# Patient Record
Sex: MALE | Employment: OTHER | URBAN - METROPOLITAN AREA
[De-identification: names, ages, dates, MRNs, and addresses within clinical notes are randomized per-mention and may not be internally consistent; named-entity substitution may affect disease eponyms.]

---

## 2017-01-03 ENCOUNTER — NURSING HOME VISIT (OUTPATIENT)
Dept: GERIATRICS | Facility: CLINIC | Age: 82
End: 2017-01-03
Payer: MEDICARE

## 2017-01-03 VITALS
OXYGEN SATURATION: 96 % | WEIGHT: 193.1 LBS | HEART RATE: 79 BPM | SYSTOLIC BLOOD PRESSURE: 110 MMHG | RESPIRATION RATE: 17 BRPM | DIASTOLIC BLOOD PRESSURE: 75 MMHG | TEMPERATURE: 98.4 F

## 2017-01-03 DIAGNOSIS — D64.9 ANEMIA, UNSPECIFIED TYPE: ICD-10-CM

## 2017-01-03 DIAGNOSIS — I10 ESSENTIAL HYPERTENSION: ICD-10-CM

## 2017-01-03 DIAGNOSIS — M17.11 PRIMARY OSTEOARTHRITIS OF RIGHT KNEE: ICD-10-CM

## 2017-01-03 DIAGNOSIS — Z98.890 S/P ORIF (OPEN REDUCTION INTERNAL FIXATION) FRACTURE: Primary | ICD-10-CM

## 2017-01-03 DIAGNOSIS — Z87.81 S/P ORIF (OPEN REDUCTION INTERNAL FIXATION) FRACTURE: Primary | ICD-10-CM

## 2017-01-03 DIAGNOSIS — R60.0 LOCALIZED EDEMA: ICD-10-CM

## 2017-01-03 PROCEDURE — 99309 SBSQ NF CARE MODERATE MDM 30: CPT | Performed by: NURSE PRACTITIONER

## 2017-01-03 PROCEDURE — 99207 ZZC CDG-CORRECTLY CODED, REVIEWED AND AGREE: CPT | Performed by: NURSE PRACTITIONER

## 2017-01-03 NOTE — PROGRESS NOTES
[facility] [facility]    Chief Complaint   Patient presents with     Nursing Home Acute       HPI:    Jasper Shah is a 81 year old  (1935), who is being seen today for an episodic care visit at [facility] . Today's concern is:  S/P ORIF (open reduction internal fixation) fracture  Healing well - progressing in Therapy -   Noticing and c/o increased edema on R side and a bit on L   Noted on celebrex since surgery and due to be done on 1/6    Essential hypertension  Low BP - on BB, HCTZ and cardura    Anemia, unspecified type  Last HGB 6.9 up to 8.8  Remains on Dye 2  On Fe    Localized edema  Worsening in past week and pt asking to be seen in regards to it.     Primary osteoarthritis of right knee  C/o pain in R knee -  Hx of cortisone inj in past.       ALLERGIES: Review of patient's allergies indicates no known allergies.  Past Medical, Surgical, Family and Social History reviewed and updated in [facility].    Current Outpatient Prescriptions   Medication Sig Dispense Refill     ASPIRIN PO Take 81 mg by mouth daily       ATENOLOL PO Take 50 mg by mouth daily       HYDROCHLOROTHIAZIDE PO Take 12.5 mg by mouth daily       Ferrous Sulfate (IRON SUPPLEMENT PO) Take 325 mg by mouth daily       Multiple Vitamins-Minerals (MULTIVITAMIN ADULT PO) Take by mouth daily       Celecoxib (CELEBREX PO) Take 200 mg by mouth daily       Polyethylene Glycol POWD 17 g daily as needed       oxyCODONE-acetaminophen (PERCOCET) 5-325 MG per tablet Take 1-2 tablets by mouth every 4 hours as needed for moderate to severe pain       senna-docusate (SENOKOT-S;PERICOLACE) 8.6-50 MG per tablet Take 2 tablets by mouth 2 times daily       GABAPENTIN PO Take 300 mg by mouth 2 times daily       Morphine Sulfate (MS CONTIN PO) Take 30 mg by mouth every 12 hours       DOXAZOSIN MESYLATE PO Take 8 mg by mouth daily       PAROXETINE HCL PO Take 20 mg by mouth daily         REVIEW OF SYSTEMS:  4 point ROS including Respiratory, CV, GI  and , other than that noted in the HPI,  is negative    Physical Exam:  /75 mmHg  Pulse 79  Temp(Src) 98.4  F (36.9  C)  Resp 17  Wt 193 lb 1.6 oz (87.59 kg)  SpO2 96%  GENERAL APPEARANCE:  Alert, in no distress  RESP:  respiratory effort and palpation of chest normal, no respiratory distress  CV:  , rate and rhythm reg,  +2 on R and trace on L peripheral edema  ABDOMEN:  normal bowel sounds, soft, nontender, no hepatosplenomegaly or other masses  M/S:   Gait and station walker, pain with palpation of R knee at tarun patella  SKIN:  Inspection and Palpation of skin and subcutaneous tissue normal - R hip incision - healed  PSYCH:  insight and judgement, memory good , affect and mood normal      Recent Labs:    12/6/16:  Sodium  139  Potassium  3.9    12/13/16:  BUN  29H  Creatinine  1.32H    12/13/16:  HGB  6.9LL  PLT  244    12/22 labs HGB 8.8 WBC 3.8   K 4.3 Cr 1.04 BUN 21    Assessment/Plan:  S/P ORIF (open reduction internal fixation) fracture  DC celebrex adn monitor pain    Essential hypertension  Low - if contineus to be low may back down on cardura  BP Readings from Last 3 Encounters:   01/03/17 110/75   12/27/16 136/67   12/20/16 131/76     Anemia, unspecified type  Recheck - might also be cause of edema - low albumin and risk for GI givne Marquez 2    Localized edema  Treat by stopping marquez 2 - compression - monitoring - check hgb.     Primary osteoarthritis of right knee  F/u ortho for injection  Or us.       Orders:  1. HGB this week on Friday  2. DC celebrex - likely cause of anemia  3. Start ACE wraps to R leg to treat edema in spiral fashion  4. Please call ortho to see if he would do cortisone inj in R knee or if he approves of us doing it on site.         Electronically signed by  CHANDANA Espino CNP

## 2017-01-05 ENCOUNTER — HOSPITAL LABORATORY (OUTPATIENT)
Facility: OTHER | Age: 82
End: 2017-01-05

## 2017-01-05 LAB — HGB BLD-MCNC: 8.7 G/DL (ref 13.3–17.7)

## 2017-01-17 ENCOUNTER — DISCHARGE SUMMARY NURSING HOME (OUTPATIENT)
Dept: GERIATRICS | Facility: CLINIC | Age: 82
End: 2017-01-17
Payer: MEDICARE

## 2017-01-17 VITALS
RESPIRATION RATE: 18 BRPM | OXYGEN SATURATION: 96 % | DIASTOLIC BLOOD PRESSURE: 61 MMHG | SYSTOLIC BLOOD PRESSURE: 139 MMHG | WEIGHT: 191.8 LBS | TEMPERATURE: 96.9 F | HEART RATE: 70 BPM

## 2017-01-17 DIAGNOSIS — Z96.641 S/P HIP REPLACEMENT, RIGHT: ICD-10-CM

## 2017-01-17 DIAGNOSIS — M16.11 PRIMARY OSTEOARTHRITIS OF RIGHT HIP: Primary | ICD-10-CM

## 2017-01-17 DIAGNOSIS — I10 ESSENTIAL HYPERTENSION: ICD-10-CM

## 2017-01-17 DIAGNOSIS — D50.8 OTHER IRON DEFICIENCY ANEMIA: ICD-10-CM

## 2017-01-17 PROCEDURE — 99316 NF DSCHRG MGMT 30 MIN+: CPT | Performed by: NURSE PRACTITIONER

## 2017-01-17 NOTE — PROGRESS NOTES
[facility] DISCHARGE SUMMARY    PATIENT'S NAME: Jasper Shah  YOB: 1935    PRIMARY CARE PROVIDER AND CLINIC RESPONSIBLE AFTER TRANSFER: Heath Arce, [facility]    CODE STATUS/ADVANCE DIRECTIVES DISCUSSION: DNR / DNI    ALLERGIES: Review of patient's allergies indicates no known allergies.    TRANSFERRING PROVIDERS:  Andreia Santoyo CNP, Elyo Kang MD   DATE OF SNF ADMISSION:  December / 16 / 2016  DATE OF SNF (anticipated) DISCHARGE: January / 20 / 2017  DISCHARGE DISPOSITION: Non-FMG Provider  Nursing Facility: [facility] stay 12/12/16  to 12/16/16.     Condition on Discharge:  Improving.    Function:  Ambulates independently with 4WW, independent with all care needs  Cognitive Scores: SLUMS 25/30    Equipment: walker      [facility] Nursing Facility Course:  Jasper admitted to TCU after elective R JHOAN to receive therapy. He is the primary caregiver for his wife, so needed to have excellent mobility in order to continue to care for her after discharge. He has achieved this,a nd is independent with all cares/mobility.       Primary osteoarthritis of right hip  S/P hip replacement, right  Ambulating, independent with all cares.     Essential hypertension  No headaches, no dizziness. Stable on current meds    Other iron deficiency anemia  HEMOGLOBIN   Date Value Ref Range Status   01/05/2017 8.7* 13.3 - 17.7 g/dL Final   ]   Hgb pending for this week before discharge, anticipate it will be much improved.           PAST MEDICAL HISTORY:  has no past medical history on file.    DISCHARGE MEDICATIONS:  Current Outpatient Prescriptions   Medication Sig Dispense Refill     ASPIRIN PO Take 81 mg by mouth daily       ATENOLOL PO Take 50 mg by mouth daily       HYDROCHLOROTHIAZIDE PO Take 12.5 mg by mouth daily       Ferrous Sulfate (IRON SUPPLEMENT PO) Take 325 mg by mouth daily       Multiple Vitamins-Minerals (MULTIVITAMIN ADULT PO) Take  by mouth daily       Polyethylene Glycol POWD 17 g daily as needed       oxyCODONE-acetaminophen (PERCOCET) 5-325 MG per tablet Take 1-2 tablets by mouth every 4 hours as needed for moderate to severe pain       senna-docusate (SENOKOT-S;PERICOLACE) 8.6-50 MG per tablet Take 2 tablets by mouth 2 times daily       GABAPENTIN PO Take 300 mg by mouth 2 times daily       Morphine Sulfate (MS CONTIN PO) Take 30 mg by mouth every 12 hours       DOXAZOSIN MESYLATE PO Take 8 mg by mouth daily       PAROXETINE HCL PO Take 20 mg by mouth daily       Celecoxib (CELEBREX PO) Take 200 mg by mouth daily         Review of Systems:  No CP, SOB, Cough, dizziness, fevers, chills, HA, N/V, dysuria or Bowel Abnormalities. Appetite is good.  Pain controlled with baseline narcotic use as per prior to surgery.     /61 mmHg  Pulse 70  Temp(Src) 96.9  F (36.1  C)  Resp 18  Wt 191 lb 12.8 oz (87 kg)  SpO2 96%    Physical Exam:  A & O x 3, NAD. Lungs CTA, non labored. RRR, S1/S2 w/o murmur,gallop or rub.  No edema.    DISCHARGE PLAN:  Homecare through the [facility] to [facility].Follow-up with PCP in 7 days.    Current [place] scheduled appointments:  None    Pending labs:   Hgb pending for 1/18/17    [facility] labs   Hospital Laboratory on 01/05/2017   Component Date Value Ref Range Status     Hemoglobin 01/05/2017 8.7* 13.3 - 17.7 g/dL Final         Discharge Treatments: none    Orders:  1.  Debrox 4gtts to both ears Bid x4 days.  2.  On 1/19/17 (before DC on 1/20/17) please flush both ears with warms water until clear when viewed with otoscope.      TOTAL DISCHARGE TIME:   Greater than 30 minutes  Electronically signed by:  Andreia Santoyo CNP   [facility]  [phone] cell

## 2017-01-18 PROBLEM — M17.11 PRIMARY OSTEOARTHRITIS OF RIGHT KNEE: Status: ACTIVE | Noted: 2017-01-18

## 2017-01-18 PROBLEM — M16.11 PRIMARY OSTEOARTHRITIS OF RIGHT HIP: Status: ACTIVE | Noted: 2017-01-18

## 2017-01-18 PROBLEM — I10 ESSENTIAL HYPERTENSION: Status: ACTIVE | Noted: 2017-01-18

## 2017-01-18 PROBLEM — Z96.641 S/P HIP REPLACEMENT, RIGHT: Status: ACTIVE | Noted: 2017-01-18

## 2017-01-18 PROBLEM — Z87.81 S/P ORIF (OPEN REDUCTION INTERNAL FIXATION) FRACTURE: Status: ACTIVE | Noted: 2017-01-18

## 2017-01-18 PROBLEM — D50.8 OTHER IRON DEFICIENCY ANEMIA: Status: ACTIVE | Noted: 2017-01-18

## 2017-01-18 PROBLEM — Z98.890 S/P ORIF (OPEN REDUCTION INTERNAL FIXATION) FRACTURE: Status: ACTIVE | Noted: 2017-01-18

## 2017-01-19 ENCOUNTER — HOSPITAL LABORATORY (OUTPATIENT)
Facility: OTHER | Age: 82
End: 2017-01-19

## 2017-01-19 LAB — HGB BLD-MCNC: 10.4 G/DL (ref 13.3–17.7)

## 2018-10-01 VITALS
RESPIRATION RATE: 16 BRPM | OXYGEN SATURATION: 92 % | DIASTOLIC BLOOD PRESSURE: 52 MMHG | HEART RATE: 56 BPM | TEMPERATURE: 98.1 F | SYSTOLIC BLOOD PRESSURE: 105 MMHG

## 2018-10-01 PROBLEM — M23.206: Status: ACTIVE | Noted: 2018-10-01

## 2018-10-01 PROBLEM — D50.9 IRON DEFICIENCY ANEMIA: Status: ACTIVE | Noted: 2017-01-18

## 2018-10-01 PROBLEM — M25.469 SWELLING OF KNEE: Status: ACTIVE | Noted: 2017-01-11

## 2018-10-01 PROBLEM — Z96.649 HISTORY OF TOTAL HIP REPLACEMENT: Status: ACTIVE | Noted: 2017-01-18

## 2018-10-01 PROBLEM — D50.0 IRON DEFICIENCY ANEMIA DUE TO CHRONIC BLOOD LOSS: Status: ACTIVE | Noted: 2017-01-18

## 2018-10-01 PROBLEM — M16.9 OSTEOARTHRITIS OF HIP: Status: ACTIVE | Noted: 2017-01-18

## 2018-10-01 RX ORDER — ACETAMINOPHEN 325 MG/1
TABLET ORAL
COMMUNITY

## 2018-10-01 NOTE — PROGRESS NOTES
[place] [facility]  PRIMARY CARE PROVIDER AND CLINIC:  Heath Arce [facility] [address] / [facility] [address]*  Chief Complaint   Patient presents with     Clinic Care Coordination - Initial     [place] Medical Record Number:  [id number]    HPI:    Jasper Shah is a 83 year old  (1935),admitted to the [facility] SNF from [facility] [place].  Hospital stay 9/25/18  through 9/28/18.  Admitted to this facility for  rehab, medical management and nursing care.  HPI information obtained from: facility chart records, facility staff and patient report.  Current issues are:            Patient had left JHOAN done on 9/25/18 by Dr. Fung at [facility]. Uncomplicated surgery/ post operative.  had right done about a year ago. To [place] today for rehab.Has Chronic back pain: Takes ms contin 30 mg bid at home.       Pain well controlled has not used as needed oxycodone in the past 2 days   Reports his MS Contin is working well to control his pain   Doing well   Denies any concerns     CODE STATUS/ADVANCE DIRECTIVES DISCUSSION:   CPR/Full code   Patient's living condition: lives alone    ALLERGIES:Review of patient's allergies indicates no known allergies.    Surgical History    Surgery Date Laterality Comments   TURP     TURP   Medical History    Medical History Date Comments   Unspecified essential hypertension       Backache, unspecified 60,s     Issue of repeat prescriptions 07     Social History    Tobacco Use Types Packs/Day Years Used Date   Former Smoker Cigarettes 1 25 Quit: 01/01/1973   Smokeless Tobacco: Never Used           Tobacco Cessation: Counseling Given: Yes  Comments: Passive exposure      Alcohol Use Drinks/Week oz/Week Comments   No 0 Standard drinks or equivalent   0.0  None     Sex Assigned at Birth Date Recorded   Not on file         Post Discharge Medication Reconciliation Status: discharge medications reconciled, continue medications without  change.  Current Outpatient Prescriptions   Medication Sig Dispense Refill     acetaminophen (TYLENOL) 325 MG tablet Take 1 tablet by mouth for pain 1-5/10 and 2 tablets by mouth for pain 6-10/10 every 4 hours as needed. Max dose 4000mg in 24 hrs  mild pain       ASPIRIN PO Take 81 mg by mouth daily       ATENOLOL PO Take 50 mg by mouth daily       DOXAZOSIN MESYLATE PO Take 8 mg by mouth daily       Ferrous Sulfate (IRON SUPPLEMENT PO) Take 65 mg by mouth 2 times daily (with meals)        GABAPENTIN PO Take 300 mg by mouth 3 times daily        HYDROCHLOROTHIAZIDE PO Take 12.5 mg by mouth daily       Morphine Sulfate (MS CONTIN PO) Take 30 mg by mouth every 12 hours       Multiple Vitamins-Minerals (MULTIVITAMIN ADULT PO) Take by mouth daily       oxyCODONE-acetaminophen (PERCOCET) 5-325 MG per tablet Take 1-2 tablets by mouth every 4 hours as needed for moderate to severe pain       PAROXETINE HCL PO Take 20 mg by mouth daily       Polyethylene Glycol POWD 17 g daily as needed       senna-docusate (SENOKOT-S;PERICOLACE) 8.6-50 MG per tablet Take 2 tablets by mouth 2 times daily       Celecoxib (CELEBREX PO) Take 200 mg by mouth daily         ROS:  10 point ROS of systems including Constitutional, Eyes, Respiratory, Cardiovascular, Gastroenterology, Genitourinary, Integumentary, Muscularskeletal, Psychiatric were all negative except for pertinent positives noted in my HPI.    Exam:  /52  Pulse 56  Temp 98.1  F (36.7  C)  Resp 16  SpO2 92%  GENERAL APPEARANCE:  Alert, in no distress  EYES:  EOM, conjunctivae, lids, pupils and irises normal  RESP:  respiratory effort and palpation of chest normal, lungs clear to auscultation , no respiratory distress  CV:  Palpation and auscultation of heart done , regular rate and rhythm, no murmur, rub, or gallop  ABDOMEN:  normal bowel sounds, soft, nontender, no hepatosplenomegaly or other masses  M/S:   Gait and station abnormal ambulates short distance with  walker  Digits and nails normal  SKIN:  Inspection of skin and subcutaneous tissue baseline, Palpation of skin and subcutaneous tissue baseline, left hip incision glued, no signs of infection healing well   NEURO:   Cranial nerves 2-12 are normal tested and grossly at patient's baseline  PSYCH:  oriented X 3, normal insight, judgement and memory    Lab/Diagnostic data:      Platelet count (09/28/2018 6:29 AM)  Only the most recent of 3 results within the time period is included.    Platelet count (09/28/2018 6:29 AM)   Component Value Ref Range Performed At   PLATELET COUNT 113 (L) 150 - 450 thou/cu mm [facility]   MPV 11.5 8.5 - 12.0 fL [facility]     Platelet count (09/28/2018 6:29 AM)   Specimen   Blood - Blood     Platelet count (09/28/2018 6:29 AM)   Performing Organization Address City/State/[facility] Phone Number   [facility]   [address]   [place] [address]      Back to top of Results      Hemoglobin (09/28/2018 6:29 AM)  Only the most recent of 4 results within the time period is included.    Hemoglobin (09/28/2018 6:29 AM)   Component Value Ref Range Performed At   HEMOGLOBIN 8.5 (L) 14.0 - 18.0 g/dL [facility]   MCV 86 80 - 97 fL [facility]   MCHC 32.8 32.0 - 36.0 g/dL [facility]     Hemoglobin (09/28/2018 6:29 AM)   Specimen   Blood - Blood     Hemoglobin (09/28/2018 6:29 AM)   Performing Organization Address City/[facility]/Zipcode Phone Number   [facility]   [address]   [place] [address]      Back to top of Results      BUN (09/28/2018 6:29 AM)  Only the most recent of 3 results within the time period is included.    BUN (09/28/2018 6:29 AM)   Component Value Ref Range Performed At   BUN 40 (H) 7 - 18 mg/dL [facility]     BUN (09/28/2018 6:29 AM)   Specimen   Blood - Blood     BUN (09/28/2018 6:29 AM)   Performing Organization  Address City/State/Zipcode Phone Number   [facility]   [address]   [place] [address]      Back to top of Results      Creatinine (09/28/2018 6:29 AM)  Only the most recent of 3 results within the time period is included.    Creatinine (09/28/2018 6:29 AM)   Component Value Ref Range Performed At   CREATININE 2.00 (H) 0.60 - 1.30 mg/dL [facility]   GFR if  39 (L) >60 ml/min/1.73m2 [facility]   GFR if not  32 (L) >60 ml/min/1.73m2 [facility]     Creatinine (09/28/2018 6:29 AM)   Specimen   Blood - Blood     Creatinine (09/28/2018 6:29 AM)   Performing Organization Address City/State/Zipcode Phone Number   [facility]   [address]   [place] [address]            ASSESSMENT/PLAN:  H/O total hip arthroplasty, left  Pain well controlled  physical therapy/OT involved in care   On full dose ASA for anti coagulation   Has follow up planned with ortho   On bowel regimen   On iron supplement     Essential hypertension  Stable on atenolol and HCTZ    Chronic bilateral low back pain with bilateral sciatica  On chronic MS contin and gabapentin     Depression   Stable on paxil        Orders:  1.  CBC and BMP on Thursday  2.  Keep incision open to air    Total time spent with patient visit at the skilled nursing facility was 25 min including patient visit and review of past records. Greater than 50% of total time spent with counseling and coordinating care due to post hospital stay    Electronically signed by:  Khadijah Hawkins NP

## 2018-10-02 ENCOUNTER — NURSING HOME VISIT (OUTPATIENT)
Dept: FAMILY MEDICINE | Facility: CLINIC | Age: 83
End: 2018-10-02
Payer: MEDICARE

## 2018-10-02 DIAGNOSIS — F33.0 MILD EPISODE OF RECURRENT MAJOR DEPRESSIVE DISORDER (H): ICD-10-CM

## 2018-10-02 DIAGNOSIS — I10 ESSENTIAL HYPERTENSION: ICD-10-CM

## 2018-10-02 DIAGNOSIS — M54.41 CHRONIC BILATERAL LOW BACK PAIN WITH BILATERAL SCIATICA: ICD-10-CM

## 2018-10-02 DIAGNOSIS — Z96.642 H/O TOTAL HIP ARTHROPLASTY, LEFT: Primary | ICD-10-CM

## 2018-10-02 DIAGNOSIS — M54.42 CHRONIC BILATERAL LOW BACK PAIN WITH BILATERAL SCIATICA: ICD-10-CM

## 2018-10-02 DIAGNOSIS — G89.29 CHRONIC BILATERAL LOW BACK PAIN WITH BILATERAL SCIATICA: ICD-10-CM

## 2018-10-02 PROCEDURE — 99309 SBSQ NF CARE MODERATE MDM 30: CPT | Performed by: NURSE PRACTITIONER

## 2018-10-02 NOTE — LETTER
10/2/2018        RE: Jasper Shah  [address]  [facility] [address]        [place] [facility]  PRIMARY CARE PROVIDER AND CLINIC:  Heath Arce [facility] [address] [facility]*  Chief Complaint   Patient presents with     Clinic Care Coordination - Initial     [place] Medical Record Number:  [id number]    HPI:    Jasper Shah is a 83 year old  (1935),[facility]  Hospital stay 9/25/18  through 9/28/18.  Admitted to this facility for  rehab, medical management and nursing care.  HPI information obtained from: facility chart records, facility staff and patient report.  Current issues are:            Patient had left JHOAN done on 9/25/18 by Dr. Fung at [facility]. Uncomplicated surgery/ post operative.  had right done about a year ago. To [place] today for rehab.Has Chronic back pain: Takes ms contin 30 mg bid at home.       Pain well controlled has not used as needed oxycodone in the past 2 days   Reports his MS Contin is working well to control his pain   Doing well   Denies any concerns     CODE STATUS/ADVANCE DIRECTIVES DISCUSSION:   CPR/Full code   Patient's living condition: lives alone    ALLERGIES:Review of patient's allergies indicates no known allergies.    Surgical History    Surgery Date Laterality Comments   TURP     TURP   Medical History    Medical History Date Comments   Unspecified essential hypertension       Backache, unspecified 60,s     Issue of repeat prescriptions 07     Social History    Tobacco Use Types Packs/Day Years Used Date   Former Smoker Cigarettes 1 25 Quit: 01/01/1973   Smokeless Tobacco: Never Used           Tobacco Cessation: Counseling Given: Yes  Comments: Passive exposure      Alcohol Use Drinks/Week oz/Week Comments   No 0 Standard drinks or equivalent   0.0  None     Sex Assigned at Birth Date Recorded   Not on file         Post Discharge Medication  Reconciliation Status: discharge medications reconciled, continue medications without change.  Current Outpatient Prescriptions   Medication Sig Dispense Refill     acetaminophen (TYLENOL) 325 MG tablet Take 1 tablet by mouth for pain 1-5/10 and 2 tablets by mouth for pain 6-10/10 every 4 hours as needed. Max dose 4000mg in 24 hrs  mild pain       ASPIRIN PO Take 81 mg by mouth daily       ATENOLOL PO Take 50 mg by mouth daily       DOXAZOSIN MESYLATE PO Take 8 mg by mouth daily       Ferrous Sulfate (IRON SUPPLEMENT PO) Take 65 mg by mouth 2 times daily (with meals)        GABAPENTIN PO Take 300 mg by mouth 3 times daily        HYDROCHLOROTHIAZIDE PO Take 12.5 mg by mouth daily       Morphine Sulfate (MS CONTIN PO) Take 30 mg by mouth every 12 hours       Multiple Vitamins-Minerals (MULTIVITAMIN ADULT PO) Take by mouth daily       oxyCODONE-acetaminophen (PERCOCET) 5-325 MG per tablet Take 1-2 tablets by mouth every 4 hours as needed for moderate to severe pain       PAROXETINE HCL PO Take 20 mg by mouth daily       Polyethylene Glycol POWD 17 g daily as needed       senna-docusate (SENOKOT-S;PERICOLACE) 8.6-50 MG per tablet Take 2 tablets by mouth 2 times daily       Celecoxib (CELEBREX PO) Take 200 mg by mouth daily         ROS:  10 point ROS of systems including Constitutional, Eyes, Respiratory, Cardiovascular, Gastroenterology, Genitourinary, Integumentary, Muscularskeletal, Psychiatric were all negative except for pertinent positives noted in my HPI.    Exam:  /52  Pulse 56  Temp 98.1  F (36.7  C)  Resp 16  SpO2 92%  GENERAL APPEARANCE:  Alert, in no distress  EYES:  EOM, conjunctivae, lids, pupils and irises normal  RESP:  respiratory effort and palpation of chest normal, lungs clear to auscultation , no respiratory distress  CV:  Palpation and auscultation of heart done , regular rate and rhythm, no murmur, rub, or gallop  ABDOMEN:  normal bowel sounds, soft, nontender, no hepatosplenomegaly or  other masses  M/S:   Gait and station abnormal ambulates short distance with walker  Digits and nails normal  SKIN:  Inspection of skin and subcutaneous tissue baseline, Palpation of skin and subcutaneous tissue baseline, left hip incision glued, no signs of infection healing well   NEURO:   Cranial nerves 2-12 are normal tested and grossly at patient's baseline  PSYCH:  oriented X 3, normal insight, judgement and memory    Lab/Diagnostic data:      Platelet count (09/28/2018 6:29 AM)  Only the most recent of 3 results within the time period is included.    Platelet count (09/28/2018 6:29 AM)   Component Value Ref Range Performed At   PLATELET COUNT 113 (L) 150 - 450 thou/cu mm [facility]   MPV 11.5 8.5 - 12.0 fL [facility]     Platelet count (09/28/2018 6:29 AM)   Specimen   Blood - Blood     Platelet count (09/28/2018 6:29 AM)   Performing Organization Address City/[facility]/Zipcode Phone Number   [facility]   [address]   [place] [address]      Back to top of Results      Hemoglobin (09/28/2018 6:29 AM)  Only the most recent of 4 results within the time period is included.    Hemoglobin (09/28/2018 6:29 AM)   Component Value Ref Range Performed At   HEMOGLOBIN 8.5 (L) 14.0 - 18.0 g/dL [facility]   MCV 86 80 - 97 fL [facility]   MCHC 32.8 32.0 - 36.0 g/dL [facility]     Hemoglobin (09/28/2018 6:29 AM)   Specimen   Blood - Blood     Hemoglobin (09/28/2018 6:29 AM)   Performing Organization Address City/[facility]/Zipcode Phone Number   [facility]   [address]   [place] [address]      Back to top of Results      BUN (09/28/2018 6:29 AM)  Only the most recent of 3 results within the time period is included.    BUN (09/28/2018 6:29 AM)   Component Value Ref Range Performed At   BUN 40 (H) 7 - 18 mg/dL [facility]     BUN (09/28/2018 6:29 AM)    Specimen   Blood - Blood     BUN (09/28/2018 6:29 AM)   Performing Organization Address City/State/Zipcode Phone Number   [facility]   [address]   [place] [address]      Back to top of Results      Creatinine (09/28/2018 6:29 AM)  Only the most recent of 3 results within the time period is included.    Creatinine (09/28/2018 6:29 AM)   Component Value Ref Range Performed At   CREATININE 2.00 (H) 0.60 - 1.30 mg/dL [facility]   GFR if  39 (L) >60 ml/min/1.73m2 [facility]   GFR if not  32 (L) >60 ml/min/1.73m2 [facility]     Creatinine (09/28/2018 6:29 AM)   Specimen   Blood - Blood     Creatinine (09/28/2018 6:29 AM)   Performing Organization Address City/State/Zipcode Phone Number   [facility]   [address]   [place] [address]            ASSESSMENT/PLAN:  H/O total hip arthroplasty, left  Pain well controlled  physical therapy/OT involved in care   On full dose ASA for anti coagulation   Has follow up planned with ortho   On bowel regimen   On iron supplement     Essential hypertension  Stable on atenolol and HCTZ    Chronic bilateral low back pain with bilateral sciatica  On chronic MS contin and gabapentin     Depression   Stable on paxil        Orders:  1.  CBC and BMP on Thursday  2.  Keep incision open to air    Total time spent with patient visit at the skilled nursing facility was 25 min including patient visit and review of past records. Greater than 50% of total time spent with counseling and coordinating care due to post hospital stay    Electronically signed by:  Khadijah Hawkins NP                    Sincerely,        Khadijah Hawkins NP

## 2018-10-04 ENCOUNTER — HOSPITAL LABORATORY (OUTPATIENT)
Facility: OTHER | Age: 83
End: 2018-10-04

## 2018-10-04 LAB
ANION GAP SERPL CALCULATED.3IONS-SCNC: 10 MMOL/L (ref 3–14)
BUN SERPL-MCNC: 24 MG/DL (ref 7–30)
CALCIUM SERPL-MCNC: 8.8 MG/DL (ref 8.5–10.1)
CHLORIDE SERPL-SCNC: 100 MMOL/L (ref 94–109)
CO2 SERPL-SCNC: 27 MMOL/L (ref 20–32)
CREAT SERPL-MCNC: 1.32 MG/DL (ref 0.66–1.25)
ERYTHROCYTE [DISTWIDTH] IN BLOOD BY AUTOMATED COUNT: 13.5 % (ref 10–15)
GFR SERPL CREATININE-BSD FRML MDRD: 52 ML/MIN/1.7M2
GLUCOSE SERPL-MCNC: 201 MG/DL (ref 70–99)
HCT VFR BLD AUTO: 30.8 % (ref 40–53)
HGB BLD-MCNC: 9.7 G/DL (ref 13.3–17.7)
MCH RBC QN AUTO: 27.8 PG (ref 26.5–33)
MCHC RBC AUTO-ENTMCNC: 31.5 G/DL (ref 31.5–36.5)
MCV RBC AUTO: 88 FL (ref 78–100)
PLATELET # BLD AUTO: 256 10E9/L (ref 150–450)
POTASSIUM SERPL-SCNC: 3.6 MMOL/L (ref 3.4–5.3)
RBC # BLD AUTO: 3.49 10E12/L (ref 4.4–5.9)
SODIUM SERPL-SCNC: 137 MMOL/L (ref 133–144)
WBC # BLD AUTO: 2.5 10E9/L (ref 4–11)

## 2018-10-10 VITALS
WEIGHT: 216.2 LBS | DIASTOLIC BLOOD PRESSURE: 53 MMHG | SYSTOLIC BLOOD PRESSURE: 125 MMHG | RESPIRATION RATE: 18 BRPM | HEART RATE: 60 BPM | OXYGEN SATURATION: 96 % | TEMPERATURE: 97.6 F | BODY MASS INDEX: 29.28 KG/M2 | HEIGHT: 72 IN

## 2018-10-10 NOTE — PROGRESS NOTES
[place] [facility] DISCHARGE SUMMARY    PATIENT'S NAME: Jasper Shah  YOB: 1935  MEDICAL RECORD NUMBER:  [id number]  Place of Service where encounter took place:  [facility] (S) [[id number]]    PRIMARY CARE PROVIDER AND CLINIC RESPONSIBLE AFTER TRANSFER: Heath Arce [facility] [address] / [place] [address]     TRANSFERRING PROVIDERS: Khadijah Hawkins NP, MD Jorge Luis  DATE OF SNF ADMISSION:  September / 28 / 2018  DATE OF SNF (anticipated) DISCHARGE: October / 12 / 2018  DISCHARGE DISPOSITION: Non-FMG Provider   RECENT HOSPITALIZATION/ED:  [facility] stay 9/25/18  to 9/28/18.     CODE STATUS/ADVANCE DIRECTIVES DISCUSSION:   CPR/Full code    No Known Allergies  Condition on Discharge:  Improving.  Function:  Independent with walker   Cognitive Scores: BIMS 15/15    Equipment: walker    DISCHARGE DIAGNOSIS:   1. S/P ORIF (open reduction internal fixation) fracture    2. Essential hypertension    3. Iron deficiency anemia due to chronic blood loss    4. Primary osteoarthritis of right knee    5. Primary osteoarthritis of right hip    6. Chronic bilateral low back pain with bilateral sciatica    7. Mild episode of recurrent major depressive disorder (H)        HPI Nursing Facility Course:  HPI information obtained from: facility chart records, facility staff and patient report.  S/P Left JHOAN- Dr. Antonieta Dee on 9/25/18  Patient rehabed at [facility] following left JHOAN  He did well with therapies   Pain controlled with home dose of MS contin alone  On ASA for anticoagulation    Only complication is stage 2 pressure ulcer on left heel       Pressure injury left heel- stage 2  Heel was noted to by dariel on admission blistered and opened this week leaving stage 2 pressure ulcer   Using optifoam dressing   No sign of infection noted today    Essential hypertension  Has remained stable on home regimen of atenolol and hydrochlorothiazide      Iron  deficiency anemia due to chronic blood loss  hgb was 8.5 on 9/28  Hemoglobin   Date Value Ref Range Status   10/04/2018 9.7 (L) 13.3 - 17.7 g/dL Final   01/19/2017 10.4 (L) 13.3 - 17.7 g/dL Final     On ferrous sulfate     Primary osteoarthritis of right knee  Primary osteoarthritis of right hip  Chronic bilateral low back pain with bilateral sciatica  On MS Contin, gabapentin     Mild episode of recurrent major depressive disorder (H)  On paxil   Stable       PAST MEDICAL HISTORY:  has no past medical history on file.    DISCHARGE MEDICATIONS:  Current Outpatient Prescriptions   Medication Sig Dispense Refill     acetaminophen (TYLENOL) 325 MG tablet Take 1 tablet by mouth for pain 1-5/10 and 2 tablets by mouth for pain 6-10/10 every 4 hours as needed. Max dose 4000mg in 24 hrs  mild pain       ASPIRIN PO Take 325 mg by mouth daily       ATENOLOL PO Take 50 mg by mouth daily       DOXAZOSIN MESYLATE PO Take 8 mg by mouth daily       Ferrous Sulfate (IRON SUPPLEMENT PO) Take 65 mg by mouth 2 times daily (with meals)        GABAPENTIN PO Take 300 mg by mouth 3 times daily        HYDROCHLOROTHIAZIDE PO Take 12.5 mg by mouth daily       Morphine Sulfate (MS CONTIN PO) Take 30 mg by mouth every 12 hours       Multiple Vitamins-Minerals (MULTIVITAMIN ADULT PO) Take by mouth daily       PAROXETINE HCL PO Take 20 mg by mouth daily       senna-docusate (SENOKOT-S;PERICOLACE) 8.6-50 MG per tablet Take 2 tablets by mouth 2 times daily         MEDICATION CHANGES/RATIONALE:   discontinued oxycodone and miralax as no longer using     Controlled medications sent with patient:   Medication: mscontin  , 1 tabs given to patient at the time of discharge to take home (has 4 today)   Reports that he has this at home and does not need refilled      ROS:    10 point ROS of systems including Constitutional, Eyes, Respiratory, Cardiovascular, Gastroenterology, Genitourinary, Integumentary, Musculoskeletal, Psychiatric were all negative  except for pertinent positives noted in my HPI.    Physical Exam:   Vitals: /53  Pulse 60  Temp 97.6  F (36.4  C)  Resp 18  Ht 6' (1.829 m)  Wt 216 lb 3.2 oz (98.1 kg)  SpO2 96%  BMI 29.32 kg/m2  BMI= Body mass index is 29.32 kg/(m^2).    GENERAL APPEARANCE:  Alert, in no distress  EYES:  EOM, conjunctivae, lids, pupils and irises normal  RESP:  respiratory effort and palpation of chest normal, lungs clear to auscultation , no respiratory distress  CV:  Palpation and auscultation of heart done , regular rate and rhythm, no murmur, rub, or gallop  ABDOMEN:  normal bowel sounds, soft, nontender, no hepatosplenomegaly or other masses  M/S:   Gait and station abnormal ambulatory with walker  Digits and nails normal  SKIN:  Open area on heel measuring about 3.5 cm x 3 cm wound base a dry and pink flesh appearance- no drainage noted, surrounding skin is intact no erythema   PSYCH:  oriented X 3, affect and mood normal    DISCHARGE PLAN:  home with outpatient PT   Patient instructed to follow-up with:  Follow up appointments as below   Current [place] scheduled appointments:  Future Appointments  10/19/2018 Appointment Family Practice Heath Arce MD    [address] [place], [place] [id number] [phone] (Fax)       10/22/2018 Appointment Orthopedic Jose Fung MD    [address] [place] [address]    [phone] (Fax)            MTM referral needed and placed by this provider: No    Pending labs: none  SNF labs none  Discharge Treatments: wound cares to heel: cleanse with wound spray apply optifoam dressing and changing every 2-3 days and as needed if soiled     Orders:  1.  Ok to discharge home 10/12/18 with outpatient therapy.  Follow up with PCP in 2wks.  Current medications and treatments with following changes:  discontinue oxycodone and miralax, continue current dressing to heel change every 2-3 days and PRN if soiled.    TOTAL DISCHARGE TIME:   Greater than 30  minutes  Electronically signed by:  Khadijah Hawkins NP

## 2018-10-11 ENCOUNTER — DISCHARGE SUMMARY NURSING HOME (OUTPATIENT)
Dept: FAMILY MEDICINE | Facility: CLINIC | Age: 83
End: 2018-10-11
Payer: MEDICARE

## 2018-10-11 DIAGNOSIS — F33.0 MILD EPISODE OF RECURRENT MAJOR DEPRESSIVE DISORDER (H): ICD-10-CM

## 2018-10-11 DIAGNOSIS — Z96.642 H/O TOTAL HIP ARTHROPLASTY, LEFT: Primary | ICD-10-CM

## 2018-10-11 DIAGNOSIS — M54.42 CHRONIC BILATERAL LOW BACK PAIN WITH BILATERAL SCIATICA: ICD-10-CM

## 2018-10-11 DIAGNOSIS — M16.11 PRIMARY OSTEOARTHRITIS OF RIGHT HIP: ICD-10-CM

## 2018-10-11 DIAGNOSIS — M17.11 PRIMARY OSTEOARTHRITIS OF RIGHT KNEE: ICD-10-CM

## 2018-10-11 DIAGNOSIS — D50.0 IRON DEFICIENCY ANEMIA DUE TO CHRONIC BLOOD LOSS: ICD-10-CM

## 2018-10-11 DIAGNOSIS — G89.29 CHRONIC BILATERAL LOW BACK PAIN WITH BILATERAL SCIATICA: ICD-10-CM

## 2018-10-11 DIAGNOSIS — I10 ESSENTIAL HYPERTENSION: ICD-10-CM

## 2018-10-11 DIAGNOSIS — M54.41 CHRONIC BILATERAL LOW BACK PAIN WITH BILATERAL SCIATICA: ICD-10-CM

## 2018-10-11 DIAGNOSIS — L89.622 PRESSURE INJURY OF LEFT HEEL, STAGE 2 (H): ICD-10-CM

## 2018-10-11 PROCEDURE — 99316 NF DSCHRG MGMT 30 MIN+: CPT | Performed by: NURSE PRACTITIONER

## 2018-10-11 NOTE — LETTER
10/11/2018        RE: Jasper Shah  [address]  [facility] [address]          [place] [facility] DISCHARGE SUMMARY    PATIENT'S NAME: Jasper Shah  YOB: 1935  MEDICAL RECORD NUMBER:  [id number]  Place of Service where encounter took place:  [facility] (S) [[id number]]    PRIMARY CARE PROVIDER AND CLINIC RESPONSIBLE AFTER TRANSFER: Heath Arce [facility] [address] [facility] / [facility] [address]     TRANSFERRING PROVIDERS: Khadijah Hawkins NP, MD Jorge Luis  DATE OF SNF ADMISSION:  September / 28 / 2018  DATE OF SNF (anticipated) DISCHARGE: October / 12 / 2018  DISCHARGE DISPOSITION: Non-FMG Provider   RECENT HOSPITALIZATION/ED:  [facility] stay 9/25/18  to 9/28/18.     CODE STATUS/ADVANCE DIRECTIVES DISCUSSION:   CPR/Full code    No Known Allergies  Condition on Discharge:  Improving.  Function:  Independent with walker   Cognitive Scores: BIMS 15/15    Equipment: walker    DISCHARGE DIAGNOSIS:   1. S/P ORIF (open reduction internal fixation) fracture    2. Essential hypertension    3. Iron deficiency anemia due to chronic blood loss    4. Primary osteoarthritis of right knee    5. Primary osteoarthritis of right hip    6. Chronic bilateral low back pain with bilateral sciatica    7. Mild episode of recurrent major depressive disorder (H)        HPI Nursing Facility Course:  HPI information obtained from: facility chart records, facility staff and patient report.  S/P Left JHOAN- Dr. Antonieta Dee on 9/25/18  Patient rehabed at [facility] following left JHOAN  He did well with therapies   Pain controlled with home dose of MS contin alone  On ASA for anticoagulation    Only complication is stage 2 pressure ulcer on left heel       Pressure injury left heel- stage 2  Heel was noted to by dariel on admission blistered and opened this week leaving stage 2 pressure ulcer   Using optifoam dressing   No sign of infection noted today    Essential  hypertension  Has remained stable on home regimen of atenolol and hydrochlorothiazide      Iron deficiency anemia due to chronic blood loss  hgb was 8.5 on 9/28  Hemoglobin   Date Value Ref Range Status   10/04/2018 9.7 (L) 13.3 - 17.7 g/dL Final   01/19/2017 10.4 (L) 13.3 - 17.7 g/dL Final     On ferrous sulfate     Primary osteoarthritis of right knee  Primary osteoarthritis of right hip  Chronic bilateral low back pain with bilateral sciatica  On MS Contin, gabapentin     Mild episode of recurrent major depressive disorder (H)  On paxil   Stable       PAST MEDICAL HISTORY:  has no past medical history on file.    DISCHARGE MEDICATIONS:  Current Outpatient Prescriptions   Medication Sig Dispense Refill     acetaminophen (TYLENOL) 325 MG tablet Take 1 tablet by mouth for pain 1-5/10 and 2 tablets by mouth for pain 6-10/10 every 4 hours as needed. Max dose 4000mg in 24 hrs  mild pain       ASPIRIN PO Take 325 mg by mouth daily       ATENOLOL PO Take 50 mg by mouth daily       DOXAZOSIN MESYLATE PO Take 8 mg by mouth daily       Ferrous Sulfate (IRON SUPPLEMENT PO) Take 65 mg by mouth 2 times daily (with meals)        GABAPENTIN PO Take 300 mg by mouth 3 times daily        HYDROCHLOROTHIAZIDE PO Take 12.5 mg by mouth daily       Morphine Sulfate (MS CONTIN PO) Take 30 mg by mouth every 12 hours       Multiple Vitamins-Minerals (MULTIVITAMIN ADULT PO) Take by mouth daily       PAROXETINE HCL PO Take 20 mg by mouth daily       senna-docusate (SENOKOT-S;PERICOLACE) 8.6-50 MG per tablet Take 2 tablets by mouth 2 times daily         MEDICATION CHANGES/RATIONALE:   discontinued oxycodone and miralax as no longer using     Controlled medications sent with patient:   Medication: mscontin  , 1 tabs given to patient at the time of discharge to take home (has 4 today)   Reports that he has this at home and does not need refilled      ROS:    10 point ROS of systems including Constitutional, Eyes, Respiratory, Cardiovascular,  Gastroenterology, Genitourinary, Integumentary, Musculoskeletal, Psychiatric were all negative except for pertinent positives noted in my HPI.    Physical Exam:   Vitals: /53  Pulse 60  Temp 97.6  F (36.4  C)  Resp 18  Ht 6' (1.829 m)  Wt 216 lb 3.2 oz (98.1 kg)  SpO2 96%  BMI 29.32 kg/m2  BMI= Body mass index is 29.32 kg/(m^2).    GENERAL APPEARANCE:  Alert, in no distress  EYES:  EOM, conjunctivae, lids, pupils and irises normal  RESP:  respiratory effort and palpation of chest normal, lungs clear to auscultation , no respiratory distress  CV:  Palpation and auscultation of heart done , regular rate and rhythm, no murmur, rub, or gallop  ABDOMEN:  normal bowel sounds, soft, nontender, no hepatosplenomegaly or other masses  M/S:   Gait and station abnormal ambulatory with walker  Digits and nails normal  SKIN:  Open area on heel measuring about 3.5 cm x 3 cm wound base a dry and pink flesh appearance- no drainage noted, surrounding skin is intact no erythema   PSYCH:  oriented X 3, affect and mood normal    DISCHARGE PLAN:  home with outpatient PT   Patient instructed to follow-up with:  Follow up appointments as below   Current [place] scheduled appointments:  Future Appointments  10/19/2018 Appointment Family Practice Heath Arce MD    [address]     [place] [address]    [phone] (Fax)       10/22/2018 Appointment Orthopedic Jose Fung MD    [address]    [place] [address]    [phone] (Fax)            MTM referral needed and placed by this provider: No    Pending labs: none  SNF labs none  Discharge Treatments: wound cares to heel: cleanse with wound spray apply optifoam dressing and changing every 2-3 days and as needed if soiled     Orders:  1.  Ok to discharge home 10/12/18 with outpatient therapy.  Follow up with PCP in 2wks.  Current medications and treatments with following changes:  discontinue oxycodone and miralax, continue current  dressing to heel change every 2-3 days and PRN if soiled.    TOTAL DISCHARGE TIME:   Greater than 30 minutes  Electronically signed by:  Khadijah Hawkins NP      Sincerely,        Khadijah Hawkins NP

## 2018-10-15 ENCOUNTER — TELEPHONE (OUTPATIENT)
Dept: FAMILY MEDICINE | Facility: CLINIC | Age: 83
End: 2018-10-15

## 2018-10-15 NOTE — TELEPHONE ENCOUNTER
Patient goes to [facility]. Was discharged from the nursing home and will follow up with [facility].    Pat [facility]

## 2018-11-15 RX ORDER — HYDROCHLOROTHIAZIDE 12.5 MG/1
CAPSULE ORAL
Qty: 30 CAPSULE | OUTPATIENT
Start: 2018-11-15

## 2018-11-15 NOTE — TELEPHONE ENCOUNTER
"Requested Prescriptions   Pending Prescriptions Disp Refills     hydrochlorothiazide (MICROZIDE) 12.5 MG capsule [Pharmacy Med Name: HYDROCHLOROTHIAZIDE 12.5MG CAP] 30 capsule      Sig: TAKE 1 CAPSULE BY MOUTH ONCE DAILY    Diuretics (Including Combos) Protocol Failed    11/15/2018  1:05 PM       Failed - Recent (12 mo) or future (30 days) visit within the authorizing provider's specialty    Patient had office visit in the last 12 months or has a visit in the next 30 days with authorizing provider or within the authorizing provider's specialty.  See \"Patient Info\" tab in inbasket, or \"Choose Columns\" in Meds & Orders section of the refill encounter.             Failed - Normal serum creatinine on file in past 12 months    Recent Labs   Lab Test  10/04/18   0857   CR  1.32*             Passed - Blood pressure under 140/90 in past 12 months    BP Readings from Last 3 Encounters:   10/10/18 125/53   10/01/18 105/52   01/16/17 120/64                Passed - Patient is age 18 or older       Passed - Normal serum potassium on file in past 12 months    Recent Labs   Lab Test  10/04/18   0857   POTASSIUM  3.6                   Passed - Normal serum sodium on file in past 12 months    Recent Labs   Lab Test  10/04/18   0857   NA  137              Medication list as reported by patient now would like new prescription for HYDROCHLOROTHIAZIDE PO .    Last office visit 10/2/18    "

## 2018-12-30 DIAGNOSIS — I10 ESSENTIAL HYPERTENSION: Primary | ICD-10-CM

## 2018-12-31 RX ORDER — DOXAZOSIN 8 MG/1
TABLET ORAL
Qty: 30 TABLET | Refills: 1 | Status: SHIPPED | OUTPATIENT
Start: 2018-12-31 | End: 2019-02-25

## 2018-12-31 NOTE — TELEPHONE ENCOUNTER
"Requested Prescriptions   Pending Prescriptions Disp Refills     doxazosin (CARDURA) 8 MG tablet [Pharmacy Med Name: DOXAZOSIN 8MG TABLET] 30 tablet      Sig: TAKE 1 TABLET BY MOUTH ONCE DAILY    Alpha Blockers Failed - 12/30/2018  5:01 PM       Failed - Recent (12 mo) or future (30 days) visit within the authorizing provider's specialty    Patient had office visit in the last 12 months or has a visit in the next 30 days with authorizing provider or within the authorizing provider's specialty.  See \"Patient Info\" tab in inbasket, or \"Choose Columns\" in Meds & Orders section of the refill encounter.             Passed - Blood pressure under 140/90 in past 12 months    BP Readings from Last 3 Encounters:   10/10/18 125/53   10/01/18 105/52   01/16/17 120/64                Passed - Patient does not have Tadalafil, Vardenafil, or Sildenafil on their medication list       Passed - Patient is 18 years of age or older        Medication list as reported by patient now would like new prescription for DOXAZOSIN MESYLATE PO .    Last office visit 8/27/18    "

## 2019-01-18 RX ORDER — GABAPENTIN 300 MG/1
CAPSULE ORAL
Qty: 90 CAPSULE | OUTPATIENT
Start: 2019-01-18

## 2019-01-18 NOTE — TELEPHONE ENCOUNTER
Pt's PCP: Heath Arce [facility] [address] / [place] [address]  Drug not on the FM refill protocol   Medication is reported/historical  Pt has a non FMG PCP    Requested Prescriptions   Pending Prescriptions Disp Refills     gabapentin (NEURONTIN) 300 MG capsule [Pharmacy Med Name: GABAPENTIN 300MG CAPSULE] 90 capsule      Sig: TAKE 1 CAPSULE BY MOUTH THREE TIMES DAILY FOR CHRONIC BACK PAIN    There is no refill protocol information for this order        Last Written Prescription Date:  -  Last Fill Quantity: -,  # refills: -   Last office visit: 10/11/2018 with Lona Hawkins CNP  Future Office Visit:      Peyton BARROSO RN

## 2019-01-20 DIAGNOSIS — I10 ESSENTIAL HYPERTENSION: Primary | ICD-10-CM

## 2019-01-21 RX ORDER — ATENOLOL 50 MG/1
TABLET ORAL
Qty: 90 TABLET | Refills: 0 | Status: SHIPPED | OUTPATIENT
Start: 2019-01-21 | End: 2019-04-15

## 2019-01-21 NOTE — TELEPHONE ENCOUNTER
"Requested Prescriptions   Pending Prescriptions Disp Refills     atenolol (TENORMIN) 50 MG tablet [Pharmacy Med Name: ATENOLOL 50MG TABLET] 30 tablet      Sig: TAKE 1 TABLET BY MOUTH ONCE DAILY    Beta-Blockers Protocol Failed - 1/20/2019  3:12 PM       Failed - Recent (12 mo) or future (30 days) visit within the authorizing provider's specialty    Patient had office visit in the last 12 months or has a visit in the next 30 days with authorizing provider or within the authorizing provider's specialty.  See \"Patient Info\" tab in inbasket, or \"Choose Columns\" in Meds & Orders section of the refill encounter.             Passed - Blood pressure under 140/90 in past 12 months    BP Readings from Last 3 Encounters:   10/10/18 125/53   10/01/18 105/52   01/16/17 120/64                Passed - Patient is age 6 or older       Passed - Medication is active on med list        Last Written Prescription Date:  Na   Last Fill Quantity: na,  # refills: na   Last office visit: 10/11/2018 with prescribing provider:  Heath Arce     Future Office Visit:      "

## 2019-01-28 RX ORDER — PAROXETINE 20 MG/1
TABLET, FILM COATED ORAL
Qty: 30 TABLET | OUTPATIENT
Start: 2019-01-28

## 2019-01-28 NOTE — TELEPHONE ENCOUNTER
"Requested Prescriptions   Pending Prescriptions Disp Refills     PARoxetine (PAXIL) 20 MG tablet [Pharmacy Med Name: PAROXETINE 20MG TABLET] 30 tablet      Sig: TAKE 1 TABLET BY MOUTH ONCE DAILY FOR DEPRESSION    SSRIs Protocol Failed - 1/27/2019  2:12 PM       Failed - Recent (12 mo) or future (30 days) visit within the authorizing provider's specialty    Patient had office visit in the last 12 months or has a visit in the next 30 days with authorizing provider or within the authorizing provider's specialty.  See \"Patient Info\" tab in inbasket, or \"Choose Columns\" in Meds & Orders section of the refill encounter.      Medication list as reported by patient now would like new prescription for PAROXETINE HCL PO .    Last office visit 10/2/18             Passed - Medication is active on med list       Passed - Patient is age 18 or older          "

## 2019-02-25 DIAGNOSIS — I10 ESSENTIAL HYPERTENSION: ICD-10-CM

## 2019-02-25 RX ORDER — DOXAZOSIN 8 MG/1
TABLET ORAL
Qty: 90 TABLET | Refills: 0 | Status: SHIPPED | OUTPATIENT
Start: 2019-02-25 | End: 2019-05-18

## 2019-02-25 NOTE — TELEPHONE ENCOUNTER
"Requested Prescriptions   Pending Prescriptions Disp Refills     doxazosin (CARDURA) 8 MG tablet [Pharmacy Med Name: DOXAZOSIN 8MG TABLET] 30 tablet 1     Sig: TAKE 1 TABLET BY MOUTH ONCE DAILY    Alpha Blockers Failed - 2/25/2019  9:38 AM       Failed - Recent (12 mo) or future (30 days) visit within the authorizing provider's specialty    Patient had office visit in the last 12 months or has a visit in the next 30 days with authorizing provider or within the authorizing provider's specialty.  See \"Patient Info\" tab in inbasket, or \"Choose Columns\" in Meds & Orders section of the refill encounter.      Last Written Prescription Date:  12/13/18  Last Fill Quantity: 30,  # refills: 1   Last office visit: 10/11/2018 with prescribing provider:     Future Office Visit:             Passed - Blood pressure under 140/90 in past 12 months    BP Readings from Last 3 Encounters:   10/10/18 125/53   10/01/18 105/52   01/16/17 120/64                Passed - Patient does not have Tadalafil, Vardenafil, or Sildenafil on their medication list       Passed - Medication is active on med list       Passed - Patient is 18 years of age or older          "

## 2019-04-15 DIAGNOSIS — I10 ESSENTIAL HYPERTENSION: ICD-10-CM

## 2019-04-15 NOTE — TELEPHONE ENCOUNTER
"Requested Prescriptions   Pending Prescriptions Disp Refills     atenolol (TENORMIN) 50 MG tablet [Pharmacy Med Name: ATENOLOL 50MG TABLET] 90 tablet 0     Sig: TAKE 1 TABLET BY MOUTH ONCE DAILY       Beta-Blockers Protocol Failed - 4/15/2019  6:30 PM        Failed - Recent (12 mo) or future (30 days) visit within the authorizing provider's specialty     Patient had office visit in the last 12 months or has a visit in the next 30 days with authorizing provider or within the authorizing provider's specialty.  See \"Patient Info\" tab in inbasket, or \"Choose Columns\" in Meds & Orders section of the refill encounter.              Passed - Blood pressure under 140/90 in past 12 months     BP Readings from Last 3 Encounters:   10/10/18 125/53   10/01/18 105/52   01/16/17 120/64                 Passed - Patient is age 6 or older        Passed - Medication is active on med list        Last Written Prescription Date:  1/21/2019  Last Fill Quantity: 90,  # refills: 0   Last office visit: 10/11/2018 with prescribing provider:  10/11/2018   Future Office Visit:      "

## 2019-04-16 RX ORDER — ATENOLOL 50 MG/1
TABLET ORAL
Qty: 30 TABLET | Refills: 0 | Status: SHIPPED | OUTPATIENT
Start: 2019-04-16 | End: 2019-05-18

## 2019-04-24 RX ORDER — GABAPENTIN 300 MG/1
300 CAPSULE ORAL 3 TIMES DAILY
OUTPATIENT
Start: 2019-04-24

## 2019-04-24 NOTE — TELEPHONE ENCOUNTER
This patient wants a new prescription for GABAPENTIN PO  Never prescribe at [facility] 10/2/19

## 2019-04-24 NOTE — TELEPHONE ENCOUNTER
gabapentin (NEURONTIN) 300 MG capsule [Pharmacy Med Name: GABAPENTIN 300MG CAPSULE] 90 capsule  1/18/2019  No   Refill refused: Patient should contact provider first (Heath Arce [facility])         Left message on identifiable voicemail for pt to contact Dr Teague's office for new script.  Lidia Natarajan RN

## 2019-05-18 DIAGNOSIS — I10 ESSENTIAL HYPERTENSION: ICD-10-CM

## 2019-05-20 RX ORDER — DOXAZOSIN 8 MG/1
8 TABLET ORAL DAILY
Qty: 90 TABLET | Refills: 0 | Status: SHIPPED | OUTPATIENT
Start: 2019-05-20

## 2019-05-20 RX ORDER — ATENOLOL 50 MG/1
50 TABLET ORAL DAILY
Qty: 30 TABLET | Refills: 0 | Status: SHIPPED | OUTPATIENT
Start: 2019-05-20 | End: 2019-06-19

## 2019-05-20 NOTE — TELEPHONE ENCOUNTER
"Requested Prescriptions   Pending Prescriptions Disp Refills     atenolol (TENORMIN) 50 MG tablet [Pharmacy Med Name: ATENOLOL 50MG TABLET] 30 tablet 0     Sig: TAKE 1 TABLET BY MOUTH ONCE DAILY       Beta-Blockers Protocol Failed - 5/18/2019  4:36 PM        Failed - Recent (12 mo) or future (30 days) visit within the authorizing provider's specialty     Patient had office visit in the last 12 months or has a visit in the next 30 days with authorizing provider or within the authorizing provider's specialty.  See \"Patient Info\" tab in inbasket, or \"Choose Columns\" in Meds & Orders section of the refill encounter.      Last Written Prescription Date:  4/16/19  Last Fill Quantity: 30,  # refills: 0   Last office visit: 10/11/2018 with prescribing provider:     Future Office Visit:                Passed - Blood pressure under 140/90 in past 12 months     BP Readings from Last 3 Encounters:   10/10/18 125/53   10/01/18 105/52   01/16/17 120/64                 Passed - Patient is age 6 or older        Passed - Medication is active on med list        doxazosin (CARDURA) 8 MG tablet [Pharmacy Med Name: DOXAZOSIN 8MG TABLET] 90 tablet 0     Sig: TAKE 1 TABLET BY MOUTH ONCE DAILY       Alpha Blockers Failed - 5/18/2019  4:36 PM        Failed - Recent (12 mo) or future (30 days) visit within the authorizing provider's specialty     Patient had office visit in the last 12 months or has a visit in the next 30 days with authorizing provider or within the authorizing provider's specialty.  See \"Patient Info\" tab in inbasket, or \"Choose Columns\" in Meds & Orders section of the refill encounter.      Last Written Prescription Date:  2/25/19  Last Fill Quantity: 90,  # refills: 0   Last office visit: 10/11/2018 with prescribing provider:     Future Office Visit:                Passed - Blood pressure under 140/90 in past 12 months     BP Readings from Last 3 Encounters:   10/10/18 125/53   10/01/18 105/52   01/16/17 120/64          "        Passed - Patient does not have Tadalafil, Vardenafil, or Sildenafil on their medication list        Passed - Medication is active on med list        Passed - Patient is 18 years of age or older

## 2019-06-19 DIAGNOSIS — I10 ESSENTIAL HYPERTENSION: ICD-10-CM

## 2019-06-20 RX ORDER — ATENOLOL 50 MG/1
50 TABLET ORAL DAILY
Qty: 30 TABLET | Refills: 0 | Status: SHIPPED | OUTPATIENT
Start: 2019-06-20

## 2019-06-20 NOTE — TELEPHONE ENCOUNTER
"Requested Prescriptions   Pending Prescriptions Disp Refills     atenolol (TENORMIN) 50 MG tablet [Pharmacy Med Name: ATENOLOL 50MG TABLET] 30 tablet 0     Sig: TAKE 1 TABLET (50 MG) BY MOUTH DAILY DUE FOR APPOINTMENT AND LABS MAY 2019. NO FURTHER REFILLS       Beta-Blockers Protocol Failed - 6/19/2019  6:12 PM        Failed - Recent (12 mo) or future (30 days) visit within the authorizing provider's specialty     Patient had office visit in the last 12 months or has a visit in the next 30 days with authorizing provider or within the authorizing provider's specialty.  See \"Patient Info\" tab in inbasket, or \"Choose Columns\" in Meds & Orders section of the refill encounter.              Passed - Blood pressure under 140/90 in past 12 months     BP Readings from Last 3 Encounters:   10/10/18 125/53   10/01/18 105/52   01/16/17 120/64                 Passed - Patient is age 6 or older        Passed - Medication is active on med list        Last Written Prescription Date:  5/12/19  Last Fill Quantity: 30,  # refills: 0   Last office visit: 10/11/2018 with prescribing provider:     Future Office Visit:      "

## 2020-05-21 NOTE — MR AVS SNAPSHOT
After Visit Summary   10/2/2018    Jasper Shah    MRN: [id number]           Patient Information     Date Of Birth          1935        Visit Information        Provider Department      10/2/2018 9:20 AM Khadijah Hawkins NP [facility]        Today's Diagnoses     H/O total hip arthroplasty, left    -  1    Essential hypertension        Chronic bilateral low back pain with bilateral sciatica        Mild episode of recurrent major depressive disorder (H)           Follow-ups after your visit        Who to contact     If you have questions or need follow up information about today's clinic visit or your schedule please contact [facility] directly at [phone].  Normal or non-critical lab and imaging results will be communicated to you by MyChart, letter or phone within 4 business days after the clinic has received the results. If you do not hear from us within 7 days, please contact the clinic through MyChart or phone. If you have a critical or abnormal lab result, we will notify you by phone as soon as possible.  Submit refill requests through [employer] or call your pharmacy and they will forward the refill request to us. Please allow 3 business days for your refill to be completed.          Additional Information About Your Visit        Care EveryWhere ID     This is your Care EveryWhere ID. This could be used by other organizations to access your [facility] medical records  [id number]        Your Vitals Were     Pulse Temperature Respirations Pulse Oximetry          56 98.1  F (36.7  C) 16 92%         Blood Pressure from Last 3 Encounters:   10/01/18 105/52   01/16/17 120/64   01/17/17 139/61    Weight from Last 3 Encounters:   01/16/17 191 lb 12.8 oz (87 kg)   01/17/17 191 lb 12.8 oz (87 kg)   01/03/17 193 lb 1.6 oz (87.6 kg)              Today, you had the following     No orders found for display         Today's Medication Changes          These changes are  Patient will only be charged for what was drawn.    accurate as of 10/2/18 11:59 PM.  If you have any questions, ask your nurse or doctor.               Stop taking these medicines if you haven't already. Please contact your care team if you have questions.     CELEBREX PO                    Primary Care Provider Office Phone # Fax #    Heath Arce [phone]       [address] [address]        Equal Access to Services     RAFAEL PARISI : Hadii aad ku hadasho Soomaali, waaxda luqadaha, qaybta kaalmada adeegyada, waxay idiin hayaan adeeg kharash lajoo . So [facility] [phone].    ATENCIÓN: Si micala espmichelle, tiene a castro disposición servicios gratuitos de asistencia lingüística. [facility] [phone].    We comply with applicable federal civil rights laws and [place] laws. We do not discriminate on the basis of race, color, national origin, age, disability, sex, sexual orientation, or gender identity.            Thank you!     Thank you for choosing [facility]  for your care. Our goal is always to provide you with excellent care. Hearing back from our patients is one way we can continue to improve our services. Please take a few minutes to complete the written survey that you may receive in the mail after your visit with us. Thank you!             Your Updated Medication List - Protect others around you: Learn how to safely use, store and throw away your medicines at www.disposemymeds.org.          This list is accurate as of 10/2/18 11:59 PM.  Always use your most recent med list.                   Brand Name Dispense Instructions for use Diagnosis    acetaminophen 325 MG tablet    TYLENOL     Take 1 tablet by mouth for pain 1-5/10 and 2 tablets by mouth for pain 6-10/10 every 4 hours as needed. Max dose 4000mg in 24 hrs mild pain        ASPIRIN PO      Take 325 mg by mouth daily        ATENOLOL PO      Take 50 mg by mouth daily        DOXAZOSIN MESYLATE PO      Take 8 mg by mouth daily         GABAPENTIN PO      Take 300 mg by mouth 3 times daily        HYDROCHLOROTHIAZIDE PO      Take 12.5 mg by mouth daily        IRON SUPPLEMENT PO      Take 65 mg by mouth 2 times daily (with meals)        MS CONTIN PO      Take 30 mg by mouth every 12 hours        MULTIVITAMIN ADULT PO      Take by mouth daily        oxyCODONE-acetaminophen 5-325 MG per tablet    PERCOCET     Take 1-2 tablets by mouth every 4 hours as needed for moderate to severe pain        PAROXETINE HCL PO      Take 20 mg by mouth daily        Polyethylene Glycol Powd      17 g daily as needed        senna-docusate 8.6-50 MG per tablet    SENOKOT-S;PERICOLACE     Take 2 tablets by mouth 2 times daily